# Patient Record
Sex: MALE | Race: WHITE | NOT HISPANIC OR LATINO | Employment: UNEMPLOYED | URBAN - METROPOLITAN AREA
[De-identification: names, ages, dates, MRNs, and addresses within clinical notes are randomized per-mention and may not be internally consistent; named-entity substitution may affect disease eponyms.]

---

## 2021-07-21 ENCOUNTER — HOSPITAL ENCOUNTER (EMERGENCY)
Facility: CLINIC | Age: 21
Discharge: HOME | End: 2021-07-21
Attending: PHYSICIAN ASSISTANT | Admitting: PHYSICIAN ASSISTANT
Payer: COMMERCIAL

## 2021-07-21 VITALS
OXYGEN SATURATION: 98 % | RESPIRATION RATE: 20 BRPM | DIASTOLIC BLOOD PRESSURE: 77 MMHG | TEMPERATURE: 98.7 F | HEART RATE: 89 BPM | SYSTOLIC BLOOD PRESSURE: 115 MMHG

## 2021-07-21 DIAGNOSIS — F19.10 DRUG ABUSE (H): ICD-10-CM

## 2021-07-21 PROCEDURE — 99284 EMERGENCY DEPT VISIT MOD MDM: CPT | Performed by: PHYSICIAN ASSISTANT

## 2021-07-21 PROCEDURE — 99283 EMERGENCY DEPT VISIT LOW MDM: CPT | Performed by: PHYSICIAN ASSISTANT

## 2021-07-21 ASSESSMENT — VISUAL ACUITY: OU: 1

## 2021-07-22 NOTE — DISCHARGE INSTRUCTIONS
It was a pleasure working with you today!  I am thankful that you are feeling much better after coming down from your mushroom high.  I would strongly recommend not using mushrooms again given how they affected you this evening.  Please do not hesitate to call 911 or return to the ED if your symptoms worsen.

## 2021-07-22 NOTE — ED TRIAGE NOTES
Pt was walking around [place] and found/picked up by EMS. Pt admitted to using 'shrooms', dilated pupils, paranoid and agreed to come for assessment. PD was able to contact mom and thought pt should be evaluated. Pt cooperative.

## 2021-07-22 NOTE — ED PROVIDER NOTES
"  History     Chief Complaint   Patient presents with     Drug Problem     HPI  Navin Dupree is a 20 year old male who presents for medical evaluation via EMS.  Patient was found wandering around the town of [place] in his stocking feet.  Patient admits to using a different type of mushroom that he has not used before.  Patient states that it was much stronger than previous experiences.  Reports increased hallucinations.  Reports doing the mushrooms at a friend's house in [place], and deciding to go for a walk.  Reports that EMS approached him.  Patient reports feeling safe, but agreed to a medical evaluation.  Patient denies any homicidal  or suicidal ideation.  Denies ever making any suicidal or homicidal statements to EMS.  EMS did not report any mention of suicidal ideation as well.  Patient reports that they use recreational mushrooms off and on.  They smoked some cigarettes this evening, but did not use any marijuana or other alcohol/drugs.  Reports they feel much better now.  Reports that the high has worn off significantly.  Denies any rhinorrhea, congestion, sore throat, change in taste/smell sensation, dyspnea, cough, chest pain, palpitations, abdominal pain, nausea, vomiting, fever, chills, diarrhea, or skin rash.  Denies regular alcohol use.  No withdrawal issues in the past.    Allergies:  No Known Allergies    Problem List:    There are no problems to display for this patient.       Past Medical History:    History reviewed. No pertinent past medical history.    Past Surgical History:    History reviewed. No pertinent surgical history.    Family History:    History reviewed. No pertinent family history.    Social History:  Marital Status:  Single [1]  Social History     Tobacco Use     Smoking status: Former Smoker     Smokeless tobacco: Never Used   Substance Use Topics     Alcohol use: Not Currently     Drug use: Yes     Comment: \"shrooms\"        Medications:    No current outpatient medications " on file.        Review of Systems   All other systems reviewed and are negative.      Physical Exam   BP: (!) 138/94  Pulse: 92  Temp: 98.7  F (37.1  C)  Resp: 18  SpO2: 99 %      Physical Exam  Vitals and nursing note reviewed.   Constitutional:       General: He is not in acute distress.     Appearance: He is not diaphoretic.   HENT:      Head: Normocephalic and atraumatic.      Comments: Negative gordon sign.  No tenderness to palpation throughout the head and scalp.  No sign of trauma.     Right Ear: Tympanic membrane, ear canal and external ear normal.      Left Ear: Tympanic membrane, ear canal and external ear normal.      Ears:      Comments: No hemotympanum.     Nose: Nose normal. No congestion or rhinorrhea.      Mouth/Throat:      Mouth: Mucous membranes are moist.      Pharynx: No oropharyngeal exudate.      Comments: No malocclusion.  No dental trauma.  Eyes:      General: Lids are normal. Vision grossly intact. No scleral icterus.        Right eye: No discharge.         Left eye: No discharge.      Extraocular Movements: Extraocular movements intact.      Conjunctiva/sclera: Conjunctivae normal.      Pupils: Pupils are equal, round, and reactive to light.      Comments: Pupils symmetrically bilateral dilated.  Respond to light and accommodation.   Neck:      Thyroid: No thyromegaly.   Cardiovascular:      Rate and Rhythm: Normal rate and regular rhythm.      Heart sounds: Normal heart sounds. No murmur heard.     Pulmonary:      Effort: Pulmonary effort is normal. No respiratory distress.      Breath sounds: Normal breath sounds. No wheezing or rales.   Chest:      Chest wall: No tenderness.   Abdominal:      General: Bowel sounds are normal. There is no distension.      Palpations: Abdomen is soft. There is no mass.      Tenderness: There is no abdominal tenderness. There is no right CVA tenderness, left CVA tenderness, guarding or rebound.   Musculoskeletal:         General: No tenderness or  deformity. Normal range of motion.      Cervical back: Normal range of motion and neck supple.      Comments: Bilateral upper and lower extremities without evidence for trauma.  No swelling, bruising, or change in range of motion of all of the joints.   Lymphadenopathy:      Cervical: No cervical adenopathy.   Skin:     General: Skin is warm and dry.      Capillary Refill: Capillary refill takes less than 2 seconds.      Findings: No erythema or rash.   Neurological:      Mental Status: He is alert and oriented to person, place, and time.      GCS: GCS eye subscore is 4. GCS verbal subscore is 5. GCS motor subscore is 6.      Cranial Nerves: Cranial nerves are intact. No cranial nerve deficit, dysarthria or facial asymmetry.      Sensory: Sensation is intact. No sensory deficit.      Motor: Motor function is intact. No weakness, tremor, atrophy or abnormal muscle tone.      Coordination: Coordination is intact. Romberg sign negative. Coordination normal. Finger-Nose-Finger Test and Heel to Shin Test normal. Rapid alternating movements normal.      Gait: Gait is intact.      Deep Tendon Reflexes:      Reflex Scores:       Bicep reflexes are 2+ on the right side and 2+ on the left side.       Patellar reflexes are 2+ on the right side and 2+ on the left side.  Psychiatric:         Attention and Perception: Attention and perception normal.         Mood and Affect: Mood normal.         Speech: Speech normal.         Behavior: Behavior normal. Behavior is cooperative.         Thought Content: Thought content normal. Thought content is not paranoid or delusional. Thought content does not include homicidal or suicidal ideation. Thought content does not include homicidal or suicidal plan.         Cognition and Memory: Cognition and memory normal.         Judgment: Judgment normal.         ED Course        Procedures              Critical Care time:  none               No results found for this or any previous visit (from  the past 24 hour(s)).    Medications - No data to display    Assessments & Plan (with Medical Decision Making)  Drug abuse (H)     20 year old individual who presents for evaluation of mushroom ingestion followed by confusion and hallucinations.  Brought in by EMS for medical evaluation.  No suicidal or homicidal thoughts.  No prior history of withdrawal.  No alcohol use.  Used a different type of mushroom that they have never experienced before.  Feels much better here in the ED and reports confusion and hallucinations have resolved.  See HPI for details.  On exam vital signs are stable.  Afebrile with a temperature of 98.7.  Exam without evidence for trauma.  Virtually normal other than bilateral dilated pupils which respond to light and accommodation.  Extraocular movements intact.  Neurologically intact.  No suicidal or homicidal ideation.  Patient appears safe.  Admits to feeling safe.  I contacted their mother, who will be the primary caregiver this evening.  She agreed to come to the ED to pick his Navin up.  No indication for further laboratory or imaging evaluation.  No sign of trauma.  No indication for psychiatric admission.  Patient states that they feel safe.  Indications for ED return reviewed.  Patient was in agreement with this plan and was suitable for discharge.     I have reviewed the nursing notes.    I have reviewed the findings, diagnosis, plan and need for follow up with the patient.       New Prescriptions    No medications on file       Final diagnoses:   Drug abuse (H) - mushrooms     Disclaimer: This note consists of symbols derived from keyboarding, dictation and/or voice recognition software. As a result, there may be errors in the script that have gone undetected. Please consider this when interpreting information found in this chart.    7/21/2021   [facility] EMERGENCY DEPT     Rodney Newman PA-C  07/21/21 1194

## 2022-08-30 ENCOUNTER — OFFICE VISIT (OUTPATIENT)
Dept: FAMILY MEDICINE | Facility: CLINIC | Age: 22
End: 2022-08-30
Payer: COMMERCIAL

## 2022-08-30 VITALS
HEART RATE: 60 BPM | WEIGHT: 170 LBS | TEMPERATURE: 98 F | BODY MASS INDEX: 21.82 KG/M2 | RESPIRATION RATE: 16 BRPM | HEIGHT: 74 IN | DIASTOLIC BLOOD PRESSURE: 78 MMHG | OXYGEN SATURATION: 97 % | SYSTOLIC BLOOD PRESSURE: 125 MMHG

## 2022-08-30 DIAGNOSIS — F41.9 ANXIETY: ICD-10-CM

## 2022-08-30 DIAGNOSIS — F64.9 GENDER DYSPHORIA: ICD-10-CM

## 2022-08-30 DIAGNOSIS — Z79.890 HORMONE REPLACEMENT THERAPY (HRT): ICD-10-CM

## 2022-08-30 DIAGNOSIS — Z00.00 ENCOUNTER FOR MEDICAL EXAMINATION TO ESTABLISH CARE: Primary | ICD-10-CM

## 2022-08-30 DIAGNOSIS — Z20.6 CONTACT WITH AND (SUSPECTED) EXPOSURE TO HUMAN IMMUNODEFICIENCY VIRUS (HIV): ICD-10-CM

## 2022-08-30 PROBLEM — F90.9 ADHD (ATTENTION DEFICIT HYPERACTIVITY DISORDER): Status: ACTIVE | Noted: 2022-08-30

## 2022-08-30 LAB
CHOLEST SERPL-MCNC: 171 MG/DL
ESTRADIOL SERPL-MCNC: 106 PG/ML
HDLC SERPL-MCNC: 70 MG/DL
HGB BLD-MCNC: 13.8 G/DL (ref 11.7–17.7)
LDLC SERPL CALC-MCNC: 73 MG/DL
NONHDLC SERPL-MCNC: 101 MG/DL
TRIGL SERPL-MCNC: 139 MG/DL

## 2022-08-30 PROCEDURE — 87491 CHLMYD TRACH DNA AMP PROBE: CPT

## 2022-08-30 PROCEDURE — 86780 TREPONEMA PALLIDUM: CPT

## 2022-08-30 PROCEDURE — 99204 OFFICE O/P NEW MOD 45 MIN: CPT | Mod: GC

## 2022-08-30 PROCEDURE — 82670 ASSAY OF TOTAL ESTRADIOL: CPT | Mod: KX

## 2022-08-30 PROCEDURE — 85018 HEMOGLOBIN: CPT

## 2022-08-30 PROCEDURE — 84403 ASSAY OF TOTAL TESTOSTERONE: CPT | Mod: KX

## 2022-08-30 PROCEDURE — 80053 COMPREHEN METABOLIC PANEL: CPT

## 2022-08-30 PROCEDURE — 80061 LIPID PANEL: CPT

## 2022-08-30 PROCEDURE — 86803 HEPATITIS C AB TEST: CPT

## 2022-08-30 PROCEDURE — 87591 N.GONORRHOEAE DNA AMP PROB: CPT

## 2022-08-30 PROCEDURE — 87389 HIV-1 AG W/HIV-1&-2 AB AG IA: CPT

## 2022-08-30 PROCEDURE — 36415 COLL VENOUS BLD VENIPUNCTURE: CPT

## 2022-08-30 RX ORDER — EMTRICITABINE AND TENOFOVIR DISOPROXIL FUMARATE 200; 300 MG/1; MG/1
1 TABLET, FILM COATED ORAL DAILY
Qty: 30 TABLET | Refills: 3 | Status: SHIPPED | OUTPATIENT
Start: 2022-08-30 | End: 2023-06-16

## 2022-08-30 RX ORDER — ESTRADIOL 2 MG/1
TABLET ORAL
COMMUNITY
Start: 2022-04-26 | End: 2023-06-16 | Stop reason: DRUGHIGH

## 2022-08-30 RX ORDER — SPIRONOLACTONE 100 MG/1
TABLET, FILM COATED ORAL
COMMUNITY
Start: 2022-06-22 | End: 2022-09-06

## 2022-08-30 RX ORDER — ESTRADIOL VALERATE 10 MG/ML
4 INJECTION INTRAMUSCULAR WEEKLY
Qty: 2 ML | Refills: 0 | Status: SHIPPED | OUTPATIENT
Start: 2022-08-30 | End: 2023-06-16

## 2022-08-30 RX ORDER — NEEDLES, DISPOSABLE 25GX5/8"
NEEDLE, DISPOSABLE MISCELLANEOUS
Qty: 100 EACH | Refills: 3 | Status: SHIPPED | OUTPATIENT
Start: 2022-08-30 | End: 2023-06-16

## 2022-08-30 RX ORDER — EMTRICITABINE AND TENOFOVIR DISOPROXIL FUMARATE 200; 300 MG/1; MG/1
1 TABLET, FILM COATED ORAL DAILY
COMMUNITY
Start: 2022-08-18 | End: 2022-08-30

## 2022-08-30 NOTE — PROGRESS NOTES
Assessment & Plan     Damaris is a 21 year old individual that uses pronouns She/Her/Hers/Herself that presents today for follow up of gender affirming care.      On feminizing HRT, goal TT <50   Hormone replacement therapy (HRT)  Gender dysphoria  Current medications: has been on spirinolactone 100mg/day and estradiol PO 6mg/day for 6 months  Plan for today  Labs today for 1 year on estrogen and harpreet. Repeat labs and follow up visit in 4 weeks   Switch from PO estradiol 6mg/day to IM valerate 4mg/week per pt preference.  Since on 6mg PO estradiol for last 6 months with good effects and no bothersome side effects, switching to moderate dose of estradiol valerate (4mg IM weekly). Continue 100mg spironolactone. Reviewed last labs (6mo ago), all normal, TT 48, estradiol safe level.   Feels confident with injections, will request injection teaching PRN  Risks/side effects: contraception: not needed (partner cannot get pregnant, no ovaries). VTE risk: low  If problems obtaining valerate, will refill PO estradiol.   - Testosterone Total; Future  - Comprehensive Metabolic Panel; Future  - Hemoglobin (HGB); Future  - Lipid Cascade; Future  - Estradiol; Future  Next steps  If labs abnormal, consider these next steps at next appointment  -  estradiol  - If >200 see in clinic for discussion about VTE risk and bone health  - on harpreet: annual CMP  - If electrolytes very abnormal, Plan: split dose  - If K low, Plan: consider supplement with K   Future labs  - Switching to estradiol valerate today, follow up in 1 month, then 6 months, then annually after that ( Hgb, Lipid, LFT s, TT, estradiol FDC between shots)    Reccomended health maintenance, feminizing HRT: On feminizing HRT, so the following are recommended for routine health maintenance:   DEXA scan: low risk, screen when patient >64 yo  CERVICAL CANCER SCREENING  No neocervix, screening not necessary    Options for treatment and follow-up care were reviewed with the  "patient.Damaris engaged in the decision making process and verbalized understanding of the options discussed and agreed with the final plan. Counselled patient about controlled substances, never share. Contraception discussed. Educated about testosterone as absolute contraindication in pregnancy. Discussed routine health maintenance for people on HRT. Understanding of risks and benefits of HRT. Doing well overall.     HRT Meds  - estradiol valerate (DELESTROGEN) 10 MG/ML OIL injection; Inject 0.4 mLs (4 mg) into the muscle once a week for 35 days  Dispense: 2 mL; Refill: 0  - Needle, Disp, (B-D BLUNT FILL NEEDLE) 18G X 1-1/2\" MISC; Use to draw up medication. Use new needle each time.  Dispense: 100 each; Refill: 3  - Needle, Disp, (B-D HYPODERMIC NEEDLE) 25G X 1\" MISC; For injection into thigh. Use new needle every time.  Dispense: 100 each; Refill: 3  - HIV Antigen Antibody Combo; Future  - Chlamydia trachomatis/Neisseria gonorrhoeae by PCR  - Hepatitis C Screen Reflex to HCV RNA Quant and Genotype; Future  - Treponema Abs w Reflex to RPR and Titer; Future  - Adult Mental Health  Referral; Future    Anxiety  - Adult Mental Health Referral; Future    On pre-exposure prophylaxis for HIV  - HIV Antigen Antibody Combo; Future  - Chlamydia trachomatis/Neisseria gonorrhoeae by PCR  - Hepatitis C Screen Reflex to HCV RNA Quant and Genotype; Future  - Treponema Abs w Reflex to RPR and Titer; Future  - emtricitabine-tenofovir (TRUVADA) 200-300 MG per tablet; Take 1 tablet by mouth daily  Dispense: 30 tablet; Refill: 3    Follow up plan  Return in about 4 weeks (around 9/27/2022) for Follow up, with me.    Ordering of each unique test  Prescription drug management  60 minutes spent on the date of the encounter doing chart review, review of outside records, patient visit and documentation            Return in about 4 weeks (around 9/27/2022) for Follow up, with me.    Samira Means MD  [facility] " "[facility]ISS      Subjective     Establish Care (Pt here to establish care and to discuss continuation of HRT. Currently taking estradiol. ) and Refill Request ([place] )      KATI Ocampo is a 21 year old individual that uses pronouns She/Her/Hers/Herself that presents today for follow up of feminizing hormone replacement therapy for gender affirmation.     HRT History  Started: 100mg rory, 2mg estradiol BID (4mg/day total)  Increased 6 mo ago to 6mg todal (2mg AM, 4mg PM)      CurrentLY  - Currently on estradiol PO 2mg (estradiol),   - Currently on 100mg Rory  - goal TT <50     Happy with changes? Yes    Changes they would like  More breast development  Softer hair    Future plans  FFS at some point    What changes are you noticing?   Breast buds stopped hurting about 1.5 months ago    Fat redistribution:yes    Weight change: no    Voice change: yes    Change in hair: No    Chest changes/development: YES    Side effects?    Chest Pains: No    Shortness of breath: No    Decreased exercise tolerance:  No    Leg pain or swelling: No    Abdominal pain: No    Change in appetite: No    Acne or oily skin: YES acne cleared up    Change in libido: different, not bad    Mood: good    Sexual side effects: no    Sexual history    New sexual partners: No    Contraception: not needed (partner does not have ovaries)      [unfilled]  No flowsheet data found.      Review of Systems   Constitutional, HEENT, cardiovascular, pulmonary, gi and gu systems are negative, except as otherwise noted.      Objective    /78   Pulse 60   Temp 98  F (36.7  C) (Oral)   Resp 16   Ht 1.88 m (6' 2.02\")   Wt 77.1 kg (170 lb)   SpO2 97%   BMI 21.82 kg/m    Body mass index is 21.82 kg/m .  Physical Exam   General: Alert and oriented, in no acute distress.  Skin: Warm and dry, no abnormalities noted.  Eyes: Extra-ocular muscles grossly intact, pupils equal.  ENT: Speech intact, nasal passages open, no hearing impairment noted.  CV: No " cyanosis or pallor, warm and well perfused.  Respiratory: No respiratory distress, no accessory muscle use.  Neuro: Gait and station normal, comprehension intact. Gross and fine motor skills intact.   Psychiatric: Mood and affect appear normal.   Extremities: Warm, able to move all four extremities at will.      Results for orders placed or performed in visit on 08/30/22 (from the past 24 hour(s))   Hemoglobin (HGB)   Result Value Ref Range    Hemoglobin 13.8 11.7 - 17.7 g/dL    Narrative    The sex of this patient cannot be reliably determined based on discrepancies in demographics (legal sex, sex assigned at birth, gender identity).  Both male and female reference ranges are provided where applicable.  Careful evaluation of the patient s results as compared to the gender specific reference intervals is required in this setting.        ----- Service Performed and Documented by Resident or Fellow ------        Samira Means MD on 8/30/2022 at 3:41 PM        .  ..

## 2022-08-30 NOTE — PROGRESS NOTES
Preceptor Attestation:   Patient seen, evaluated and discussed with the resident. I have verified the content of the note, which accurately reflects my assessment of the patient and the plan of care.   Supervising Physician:  Navi Pelletier MD

## 2022-08-30 NOTE — PATIENT INSTRUCTIONS
"So nice to meet you today!  Please schedule the following appointments in this order:  Appointment with me in 4-5 weeks, preferably with me.   \"Lab-only\" appointment a few days before our HRT follow up appointment in 4-5 weeks. Please schedule that appointment for a Wednesday (3 days after doing shot)  Call the [facility] clinic [phone] or stop by the  to schedule     If you don't get a call to schedule therapy, call [facility]  at [phone]  Start estradiol valerate IM, 4mg once/week this Sunday  Keep taking 100mg harpreet  Labs    Patient Education   Here is the plan from today's visit    Hormone replacement therapy (HRT)  HRT Labs  Labs today for 1 year on estrogen and harpreet. Repeat labs in 4 weeks since changing to Estradiol valerate 4mg once/week, IM.   - Testosterone Total; Future  - Comprehensive Metabolic Panel; Future  - Hemoglobin (HGB); Future  - Lipid Cascade; Future  - Estradiol; Future    HRT Meds  - estradiol valerate (DELESTROGEN) 10 MG/ML OIL injection; Inject 0.4 mLs (4 mg) into the muscle once a week for 35 days  Dispense: 2 mL; Refill: 0  - Needle, Disp, (B-D BLUNT FILL NEEDLE) 18G X 1-1/2\" MISC; Use to draw up medication. Use new needle each time.  Dispense: 100 each; Refill: 3  - Needle, Disp, (B-D HYPODERMIC NEEDLE) 25G X 1\" MISC; For injection into thigh. Use new needle every time.  Dispense: 100 each; Refill: 3  - HIV Antigen Antibody Combo; Future  - Chlamydia trachomatis/Neisseria gonorrhoeae by PCR  - Hepatitis C Screen Reflex to HCV RNA Quant and Genotype; Future  - Treponema Abs w Reflex to RPR and Titer; Future  - Adult Mental Health  Referral; Future    Anxiety  - Adult Mental Health Referral; Future    On pre-exposure prophylaxis for HIV  - HIV Antigen Antibody Combo; Future  - Chlamydia trachomatis/Neisseria gonorrhoeae by PCR  - Hepatitis C Screen Reflex to HCV RNA Quant and Genotype; Future  - Treponema Abs w Reflex to RPR and Titer; Future  - " emtricitabine-tenofovir (TRUVADA) 200-300 MG per tablet; Take 1 tablet by mouth daily  Dispense: 30 tablet; Refill: 3        Follow up plan  Return in about 4 weeks (around 9/27/2022) for Follow up, with me.    Thank you for coming to [facility] today.  Lab Testing:  **If you had lab testing today and your results are reassuring or normal they will be mailed to you or sent through [employer] within 7 days.   **If the lab tests need quick action we will call you with the results.  **If you are having labs done on a different day, please call [phone] to schedule at [facility] or [phone] for other [facility] Lab locations. Labs do not offer walk-in appointments.  The phone number we will call with results is # [phone] (home) . If this is not the best number please call our clinic and change the number.  Medication Refills:  If you need any refills please call your pharmacy and they will contact us.   If you need to  your refill at a new pharmacy, please contact the new pharmacy directly. The new pharmacy will help you get your medications transferred faster.   Scheduling:  If you have any concerns about today's visit or wish to schedule another appointment please call our office during normal business hours [phone] (8-5:00 M-F)  If a referral was made to an [facility] specialty provider and you do not get a call from central scheduling, please refer to directions on your visit summary or call our office during normal business hours for assistance.   If a Mammogram was ordered for you at the Breast Center call [phone] to schedule or change your appointment.  If you had an XRay/CT/Ultrasound/MRI ordered the number is [phone] to schedule or change your radiology appointment.   [facility] has limited ultrasound appointments available on Wednesdays, if you would like your ultrasound at [facility], please call [phone] to schedule.   Medical  Concerns:  If you have urgent medical concerns please call [phone] at any time of the day.    Samira Means MD

## 2022-08-31 LAB
ALBUMIN SERPL BCG-MCNC: 4.7 G/DL (ref 3.5–5.2)
ALP SERPL-CCNC: 55 U/L (ref 35–129)
ALT SERPL W P-5'-P-CCNC: 35 U/L (ref 10–50)
ANION GAP SERPL CALCULATED.3IONS-SCNC: 10 MMOL/L (ref 7–15)
AST SERPL W P-5'-P-CCNC: 31 U/L (ref 10–50)
BILIRUB SERPL-MCNC: 0.3 MG/DL
BUN SERPL-MCNC: 14.9 MG/DL (ref 6–20)
C TRACH DNA SPEC QL PROBE+SIG AMP: NEGATIVE
CALCIUM SERPL-MCNC: 9.4 MG/DL (ref 8.6–10)
CHLORIDE SERPL-SCNC: 101 MMOL/L (ref 98–107)
CREAT SERPL-MCNC: 0.88 MG/DL (ref 0.51–1.17)
DEPRECATED HCO3 PLAS-SCNC: 24 MMOL/L (ref 22–29)
GFR SERPL CREATININE-BSD FRML MDRD: >90 ML/MIN/1.73M2
GLUCOSE SERPL-MCNC: 91 MG/DL (ref 70–99)
HCV AB SERPL QL IA: NONREACTIVE
HIV 1+2 AB+HIV1 P24 AG SERPL QL IA: NONREACTIVE
N GONORRHOEA DNA SPEC QL NAA+PROBE: NEGATIVE
POTASSIUM SERPL-SCNC: 4.6 MMOL/L (ref 3.4–5.3)
PROT SERPL-MCNC: 7.4 G/DL (ref 6.4–8.3)
SODIUM SERPL-SCNC: 135 MMOL/L (ref 136–145)
T PALLIDUM AB SER QL: NONREACTIVE

## 2022-08-31 NOTE — RESULT ENCOUNTER NOTE
Dear Damaris,     Here are your recent results, all which are normal. Estradiol is in a safe range. Your sodium (Na) was low, but only one point, and everything else was normal so I don't think we need to do anything differently. Will recheck it with next set of HRT follow up labs.     Please continue with your current plan of care and let us know if you have any questions or concerns.    Testosterone Total takes a few days to come back, so still waiting on that.     Regards,  Samira Means MD

## 2022-09-02 LAB — TESTOST SERPL-MCNC: 201 NG/DL (ref 8–950)

## 2022-09-03 NOTE — RESULT ENCOUNTER NOTE
Dear Damaris  Your total testosterone was a little bit above the typical goal on feminizing HRT, which is less than 50.  Your last testosterone level was within goal.  We can talk about making changes to your medications at our next visit, if that something that you would like to do. the spironolactone should be blocking a lot of the androgen effects of the testosterone, even if your testosterone is not being suppressed as much as it could be, so that is good.  Looking forward to seeing you at your next appointment,  Samira Means MD on 9/3/2022 at 12:20 PM

## 2022-09-08 ENCOUNTER — IMMUNIZATION (OUTPATIENT)
Dept: FAMILY MEDICINE | Facility: CLINIC | Age: 22
End: 2022-09-08
Payer: COMMERCIAL

## 2022-09-08 DIAGNOSIS — Z23 HIGH PRIORITY FOR 2019-NCOV VACCINE: Primary | ICD-10-CM

## 2022-09-08 PROCEDURE — 99207 PR NO CHARGE LOS: CPT

## 2022-09-08 PROCEDURE — 0051A COVID-19,PF,PFIZER (12+ YRS): CPT

## 2022-09-08 PROCEDURE — 91305 COVID-19,PF,PFIZER (12+ YRS): CPT

## 2022-10-16 ENCOUNTER — TELEPHONE (OUTPATIENT)
Dept: FAMILY MEDICINE | Facility: CLINIC | Age: 22
End: 2022-10-16

## 2022-10-16 NOTE — TELEPHONE ENCOUNTER
Prior Authorization Retail Medication Request    Medication/Dose: estradiol valerate (DELESTROGEN) 10 MG/ML OIL injection  ICD code (if different than what is on RX):    Hormone replacement therapy (HRT) [Z79.890]       Gender dysphoria [F64.9]           Previously Tried and Failed:  See Chart  Rationale:  See Chart     Insurance Name:  [employer] [place]  Insurance ID:  [id number]       Pharmacy Information (if different than what is on RX)  Name:    [id number] - [place] - [address]       Phone:  [phone]

## 2022-10-18 ENCOUNTER — IMMUNIZATION (OUTPATIENT)
Dept: NURSING | Facility: CLINIC | Age: 22
End: 2022-10-18
Payer: COMMERCIAL

## 2022-10-18 PROCEDURE — 0052A COVID-19,PF,PFIZER (12+ YRS): CPT

## 2022-10-18 PROCEDURE — 91305 COVID-19,PF,PFIZER (12+ YRS): CPT

## 2022-10-18 NOTE — TELEPHONE ENCOUNTER
Central Prior Authorization Team   Phone: [phone]      PA NOT NEEDED    Medication: estradiol valerate (DELESTROGEN) 10 MG/ML OIL injection  Insurance Company: [employer] - Phone [phone] Fax [phone]  Pharmacy Filling the Rx: [id number] - [place], [place] - [address]  Filling Pharmacy Phone: [phone]  Filling Pharmacy Fax:    Start Date: 10/18/2022    Insurance prefers the generic but that comes in a strength of 20mg/mL oil, the RPH at the pharmacy adjusted the directions for the 20mg and will order the medication in for the patient.  **Instructed pharmacy to notify patient when script is ready to /ship.**

## 2022-10-23 ENCOUNTER — HEALTH MAINTENANCE LETTER (OUTPATIENT)
Age: 22
End: 2022-10-23

## 2022-12-10 ENCOUNTER — HEALTH MAINTENANCE LETTER (OUTPATIENT)
Age: 22
End: 2022-12-10

## 2023-02-08 ENCOUNTER — DOCUMENTATION ONLY (OUTPATIENT)
Dept: LAB | Facility: CLINIC | Age: 23
End: 2023-02-08
Payer: COMMERCIAL

## 2023-02-09 ENCOUNTER — TELEPHONE (OUTPATIENT)
Dept: FAMILY MEDICINE | Facility: CLINIC | Age: 23
End: 2023-02-09
Payer: COMMERCIAL

## 2023-02-09 DIAGNOSIS — F64.9 GENDER DYSPHORIA: ICD-10-CM

## 2023-02-09 DIAGNOSIS — Z79.890 HORMONE REPLACEMENT THERAPY (HRT): Primary | ICD-10-CM

## 2023-02-09 NOTE — TELEPHONE ENCOUNTER
Tried to call patient to set up a virtual visit with Dr. Means to follow up on the lab work being done on 02/20. Voicemail not set up- not able to leave a message. (Appointment request message)

## 2023-02-10 NOTE — CONFIDENTIAL NOTE
Orders only encounter for feminizing HRT follow up labs    Testosterone total  Estradiol  BMP (on harpreet)    Previous lipid and Hgb normal, no need for repeat    Smaira Means MD

## 2023-05-17 ENCOUNTER — LAB (OUTPATIENT)
Dept: LAB | Facility: CLINIC | Age: 23
End: 2023-05-17
Payer: COMMERCIAL

## 2023-05-17 DIAGNOSIS — F64.9 GENDER DYSPHORIA: ICD-10-CM

## 2023-05-17 DIAGNOSIS — Z79.890 HORMONE REPLACEMENT THERAPY (HRT): ICD-10-CM

## 2023-05-17 PROCEDURE — 84403 ASSAY OF TOTAL TESTOSTERONE: CPT

## 2023-05-17 PROCEDURE — 80048 BASIC METABOLIC PNL TOTAL CA: CPT

## 2023-05-17 PROCEDURE — 82670 ASSAY OF TOTAL ESTRADIOL: CPT

## 2023-05-17 PROCEDURE — 36415 COLL VENOUS BLD VENIPUNCTURE: CPT

## 2023-05-18 LAB
ANION GAP SERPL CALCULATED.3IONS-SCNC: 11 MMOL/L (ref 7–15)
BUN SERPL-MCNC: 10.6 MG/DL (ref 6–20)
CALCIUM SERPL-MCNC: 9.6 MG/DL (ref 8.6–10)
CHLORIDE SERPL-SCNC: 104 MMOL/L (ref 98–107)
CREAT SERPL-MCNC: 0.99 MG/DL (ref 0.51–1.17)
DEPRECATED HCO3 PLAS-SCNC: 24 MMOL/L (ref 22–29)
ESTRADIOL SERPL-MCNC: 192 PG/ML
GFR SERPL CREATININE-BSD FRML MDRD: >90 ML/MIN/1.73M2
GLUCOSE SERPL-MCNC: 87 MG/DL (ref 70–99)
POTASSIUM SERPL-SCNC: 4.2 MMOL/L (ref 3.4–5.3)
SODIUM SERPL-SCNC: 139 MMOL/L (ref 136–145)

## 2023-05-19 LAB — TESTOST SERPL-MCNC: 348 NG/DL (ref 8–950)

## 2023-05-19 NOTE — RESULT ENCOUNTER NOTE
Results communicated to patient via [email or web address]      Dear Damaris  Here are your results. Your testosterone is 348. I would like to discuss this in clinic to see if we need to make medication changes. Please make an appointment with me at your soonest convenience. Video visit is ok. Please call the  to make this appointment during business hours.

## 2023-06-16 ENCOUNTER — OFFICE VISIT (OUTPATIENT)
Dept: FAMILY MEDICINE | Facility: CLINIC | Age: 23
End: 2023-06-16
Payer: COMMERCIAL

## 2023-06-16 VITALS
HEIGHT: 74 IN | OXYGEN SATURATION: 100 % | RESPIRATION RATE: 17 BRPM | WEIGHT: 184.4 LBS | TEMPERATURE: 98.8 F | BODY MASS INDEX: 23.66 KG/M2

## 2023-06-16 DIAGNOSIS — Z79.890 HORMONE REPLACEMENT THERAPY (HRT): Primary | ICD-10-CM

## 2023-06-16 DIAGNOSIS — F64.9 GENDER DYSPHORIA: ICD-10-CM

## 2023-06-16 DIAGNOSIS — F17.290 NICOTINE DEPENDENCE DUE TO VAPING TOBACCO PRODUCT: ICD-10-CM

## 2023-06-16 DIAGNOSIS — Z71.6 ENCOUNTER FOR SMOKING CESSATION COUNSELING: ICD-10-CM

## 2023-06-16 DIAGNOSIS — Z20.6 CONTACT WITH AND (SUSPECTED) EXPOSURE TO HUMAN IMMUNODEFICIENCY VIRUS (HIV): ICD-10-CM

## 2023-06-16 LAB
CREAT SERPL-MCNC: 0.94 MG/DL (ref 0.51–1.17)
GFR SERPL CREATININE-BSD FRML MDRD: >90 ML/MIN/1.73M2

## 2023-06-16 PROCEDURE — 99214 OFFICE O/P EST MOD 30 MIN: CPT | Mod: GC

## 2023-06-16 PROCEDURE — 36415 COLL VENOUS BLD VENIPUNCTURE: CPT

## 2023-06-16 PROCEDURE — 82565 ASSAY OF CREATININE: CPT

## 2023-06-16 PROCEDURE — 87389 HIV-1 AG W/HIV-1&-2 AB AG IA: CPT

## 2023-06-16 RX ORDER — SYRINGE AND NEEDLE,INSULIN,1ML 25GX1"
SYRINGE, EMPTY DISPOSABLE MISCELLANEOUS
COMMUNITY
Start: 2022-09-02 | End: 2023-06-16

## 2023-06-16 RX ORDER — ESTRADIOL 2 MG/1
TABLET ORAL
Qty: 284 TABLET | Refills: 0 | Status: SHIPPED | OUTPATIENT
Start: 2023-06-16 | End: 2023-06-27

## 2023-06-16 RX ORDER — ESTRADIOL VALERATE 20 MG/ML
INJECTION INTRAMUSCULAR
COMMUNITY
Start: 2022-11-06 | End: 2023-06-16

## 2023-06-16 RX ORDER — EMTRICITABINE AND TENOFOVIR DISOPROXIL FUMARATE 200; 300 MG/1; MG/1
1 TABLET, FILM COATED ORAL DAILY
Qty: 30 TABLET | Refills: 3 | Status: SHIPPED | OUTPATIENT
Start: 2023-06-16 | End: 2023-10-18

## 2023-06-16 RX ORDER — ESTRADIOL 2 MG/1
TABLET ORAL
Status: CANCELLED | OUTPATIENT
Start: 2023-06-16

## 2023-06-16 RX ORDER — SPIRONOLACTONE 100 MG/1
200 TABLET, FILM COATED ORAL DAILY
Qty: 180 TABLET | Refills: 3 | Status: CANCELLED | OUTPATIENT
Start: 2023-06-16

## 2023-06-16 RX ORDER — SPIRONOLACTONE 100 MG/1
200 TABLET, FILM COATED ORAL DAILY
Qty: 180 TABLET | Refills: 3 | Status: SHIPPED | OUTPATIENT
Start: 2023-06-16 | End: 2023-10-20

## 2023-06-16 RX ORDER — EMTRICITABINE AND TENOFOVIR DISOPROXIL FUMARATE 200; 300 MG/1; MG/1
1 TABLET, FILM COATED ORAL DAILY
Qty: 30 TABLET | Refills: 3 | Status: CANCELLED | OUTPATIENT
Start: 2023-06-16

## 2023-06-16 ASSESSMENT — PAIN SCALES - GENERAL: PAINLEVEL: NO PAIN (0)

## 2023-06-16 NOTE — PATIENT INSTRUCTIONS
Thank you for coming to [facility] today.  Here is the plan from today's visit  Resume oral estrogen, 4 mg once/day  Increase to 6mg once/day after 1 week  I will send 90 day supply  Call  to schedule visit with me  Follow up in 3 months for repeat testosterone and PrEP labs and check-in with me  PrEP labs today  I will send prescription for PrEP today    Results will be communicated via: [email or web address]  Note: Please allow 7 business days for communication of lab results.   Urgent lab results will be communicated sooner with telephone call.     Lab Testing:  **If you had lab testing today and your results are reassuring or normal they will be mailed to you or sent through [employer] within 7 days.   **If the lab tests need quick action we will call you with the results.  **If you are having labs done on a different day, please call [phone] to schedule at [facility] or [phone] for other [facility] locations. Labs do not offer walk-in appointments.  The phone number we will call with results is # [phone] (home) . If this is not the best number please call our clinic and change the number.  Medication Refills:  If you need any refills please call your pharmacy and they will contact us.   If you need to  your refill at a new pharmacy, please contact the new pharmacy directly. The new pharmacy will help you get your medications transferred faster.   Scheduling:  If a referral was made to an [facility] specialty provider and you do not get a call from central scheduling, please refer to directions on your visit summary or call our office during normal business hours for assistance: [phone] (8-5:00 M-F)  If you have any concerns about today's visit or wish to schedule another appointment please call our office during normal business hours [phone] (8-5:00 M-F)  If a Mammogram was ordered for you at the [facility] call [phone] to schedule or change  your appointment.  If you had an XRay/CT/Ultrasound/MRI ordered the number is [phone] to schedule or change your radiology appointment.   [facility] has limited ultrasound appointments available on Wednesdays, if you would like your ultrasound at [facility], please call [phone] to schedule.   Medical Concerns:  If you have urgent medical concerns please call [phone] at any time of the day.    Samira Means MD

## 2023-06-16 NOTE — PROGRESS NOTES
Assessment & Plan     Damaris is a 22 year old patient who presents for the following issues:    Hormone replacement therapy (HRT)  Gender dysphoria  Happy with feminizing changes so far. Started HRT 2/2022. 8/30/22 switched from 6mg PO estradiol daily to 4mg estradiol valerate IM once/week. Then had difficulty taking consistently so was going average of 13 days between shots. Likely why TT was above goal when checked. Seeing return of body hair and spontaneous erections which is bothersome.   Last labs reviewed: 1 month ago (5/17/23) BMP WNL, testosterone 348. Estradiol 192 which is within normal limits.   Testosterone levels have not been within goal (<50) considering desires for physical changes.   Risks/side effects: contraception: not needed (partner cannot get pregnant, no ovaries). VTE risk: low-moderate. Smoking cessation counseling provided.   Plan:  - Resume oral estradiol 4mg, increase to 6mg after 7 days (previous stable dose)  - Continue spirinolactone  - Labs: due for labs today? NO   - Spironolactone: annual CMP normal, next due 6/16/2024   - Estradiol: annual Hgb, Lipid, LFTs, estradiol normal, next due 6/16/2024   - TT above goal, resume oral estradiol and repeat in 3 months (ordered)  - spironolactone (ALDACTONE) 100 MG tablet  Dispense: 180 tablet; Refill: 3  - estradiol (ESTRACE) 2 MG tablet  Dispense: 284 tablet; Refill: 0      Contact with and (suspected) exposure to human immunodeficiency virus (hiv)  On PrEP, due for labs. Q6mo Cr and Q3mo HIV test.   - Creatinine  - HIV Antigen Antibody Combo  - emtricitabine-tenofovir (TRUVADA) 200-300 MG per tablet  Dispense: 30 tablet; Refill: 3  - Repeat HIV in 3 months (ordered)    Nicotine dependence  Smoking cessation  Counseled, would like to try lozenges.  -Nicotine lozenge 4mg q1h PRN      Orders Placed This Encounter   Procedures     Creatinine     HIV Antigen Antibody Combo     HIV Antigen Antibody Combo     Testosterone total         Post  "Medication Reconciliation Status: medications reconciled and changed, per note/orders    Return in about 3 months (around 9/16/2023) for with me.    Samira Means MD  [facility]    Subjective   HPI     Follow Up (HRT f/u)    Damaris is a 22 year old individual that uses pronouns She/Her/Hers/Herself that presents today for follow up of feminizing hormone replacement therapy for gender affirmation.     Started HRT 2/2022. 8/30/22 switched from 6mg PO estradiol daily to 4mg estradiol valerate IM once/week. Then had difficulty taking consistently so was going average of 13 days between shots. Experienced mood lability from this. Also having return of body hair and spontaneous erections which is bothersome. Happy with feminizing effects they are seeing. Seeing reversal of redistribution of body fat which is bothersome. Other than the return of the above physical changes, happy with HRT. Would like to return to oral estradiol.     Smoking vape, switched from cigarettes. Interested in NRT.        Review of Systems   Constitutional, HEENT, cardiovascular, pulmonary, gi and gu systems are negative, except as otherwise noted.      Objective    Temp 98.8  F (37.1  C)   Resp 17   Ht 1.88 m (6' 2\")   Wt 83.6 kg (184 lb 6.4 oz)   SpO2 100%   BMI 23.68 kg/m    Wt Readings from Last 4 Encounters:   06/16/23 83.6 kg (184 lb 6.4 oz)   08/30/22 77.1 kg (170 lb)       Physical Exam    General: Alert and oriented, in no acute distress.  CV: No cyanosis or pallor, warm and well perfused.  Respiratory: No respiratory distress, no accessory muscle use.  Psychiatric: Mood and affect appear normal.       Results are ordered and pending    Samira Means MD on 6/16/2023 at 11:28 AM    ----- Service Performed and Documented by Resident or Fellow ------            .  "

## 2023-06-17 ENCOUNTER — MYC REFILL (OUTPATIENT)
Dept: FAMILY MEDICINE | Facility: CLINIC | Age: 23
End: 2023-06-17
Payer: COMMERCIAL

## 2023-06-17 DIAGNOSIS — Z79.890 HORMONE REPLACEMENT THERAPY (HRT): ICD-10-CM

## 2023-06-17 DIAGNOSIS — F64.9 GENDER DYSPHORIA: ICD-10-CM

## 2023-06-17 LAB — HIV 1+2 AB+HIV1 P24 AG SERPL QL IA: NONREACTIVE

## 2023-06-20 DIAGNOSIS — F64.9 GENDER DYSPHORIA: ICD-10-CM

## 2023-06-20 DIAGNOSIS — Z79.890 HORMONE REPLACEMENT THERAPY (HRT): ICD-10-CM

## 2023-06-20 NOTE — TELEPHONE ENCOUNTER
"Request for medication refill: estradiol (ESTRACE) 2 MG tablet    Last Visit- 06/16/2023    Providers if patient needs an appointment and you are willing to give a one month supply please refill for one month and  send a letter/MyChart using \".SMILLIMITEDREFILL\" .smillimited and route chart to \"P SMI \" (Giving one month refill in non controlled medications is strongly recommended before denial)    If refill has been denied, meaning absolutely no refills without visit, please complete the smart phrase \".smirxrefuse\" and route it to the \"P SMI MED REFILLS\"  pool to inform the patient and the pharmacy.    Navi Miranda [facility]        "

## 2023-06-20 NOTE — PROGRESS NOTES
Addendum    No new sexual partners since last STI screening, so screening aside from HIV for PrEP not indicated.    Samira Means MD

## 2023-06-27 ENCOUNTER — MYC REFILL (OUTPATIENT)
Dept: FAMILY MEDICINE | Facility: CLINIC | Age: 23
End: 2023-06-27
Payer: COMMERCIAL

## 2023-06-27 DIAGNOSIS — F64.9 GENDER DYSPHORIA: ICD-10-CM

## 2023-06-27 DIAGNOSIS — Z79.890 HORMONE REPLACEMENT THERAPY (HRT): ICD-10-CM

## 2023-06-27 RX ORDER — ESTRADIOL 2 MG/1
TABLET ORAL
Qty: 284 TABLET | Refills: 0 | Status: SHIPPED | OUTPATIENT
Start: 2023-06-27 | End: 2023-10-20

## 2023-06-28 RX ORDER — ESTRADIOL 2 MG/1
TABLET ORAL
Qty: 284 TABLET | Refills: 0 | OUTPATIENT
Start: 2023-06-28 | End: 2023-10-03

## 2023-10-16 ENCOUNTER — MYC REFILL (OUTPATIENT)
Dept: FAMILY MEDICINE | Facility: CLINIC | Age: 23
End: 2023-10-16
Payer: COMMERCIAL

## 2023-10-16 DIAGNOSIS — Z79.890 HORMONE REPLACEMENT THERAPY (HRT): ICD-10-CM

## 2023-10-16 DIAGNOSIS — F64.9 GENDER DYSPHORIA: ICD-10-CM

## 2023-10-18 DIAGNOSIS — Z79.890 HORMONE REPLACEMENT THERAPY (HRT): ICD-10-CM

## 2023-10-18 DIAGNOSIS — F64.9 GENDER DYSPHORIA: ICD-10-CM

## 2023-10-18 DIAGNOSIS — Z20.6 CONTACT WITH AND (SUSPECTED) EXPOSURE TO HUMAN IMMUNODEFICIENCY VIRUS (HIV): Primary | ICD-10-CM

## 2023-10-18 DIAGNOSIS — Z20.6 CONTACT WITH AND (SUSPECTED) EXPOSURE TO HUMAN IMMUNODEFICIENCY VIRUS (HIV): ICD-10-CM

## 2023-10-18 RX ORDER — EMTRICITABINE AND TENOFOVIR DISOPROXIL FUMARATE 200; 300 MG/1; MG/1
1 TABLET, FILM COATED ORAL DAILY
Qty: 30 TABLET | Refills: 1 | Status: SHIPPED | OUTPATIENT
Start: 2023-10-18

## 2023-10-18 NOTE — TELEPHONE ENCOUNTER
"Request for medication refill:  emtricitabine-tenofovir (TRUVADA) 200-300 MG per table     Providers if patient needs an appointment and you are willing to give a one month supply please refill for one month and  send a letter/MyChart using \".SMILLIMITEDREFILL\" .smillimited and route chart to \"P SMI \" (Giving one month refill in non controlled medications is strongly recommended before denial)    If refill has been denied, meaning absolutely no refills without visit, please complete the smart phrase \".smirxrefuse\" and route it to the \"P SMI MED REFILLS\"  pool to inform the patient and the pharmacy.    Kandace Paz CMA      "

## 2023-10-18 NOTE — TELEPHONE ENCOUNTER
HIV Prevention / PrEP - Refill    Last HIV test: pos/neg: negative Date: 6/16/2023 (4 months ago)  Last Cr: normal, Date Date: 6/16/2023 (4 months ago)    Plan  PrEP follow up lab schedule  - HIV antigen / antiobdy combo q6mo, next due: 12/16/2023  - Serum Cr q6mo, next due: 12/16/2023  Refill for 2 months    Samira Means MD  10/18/2023

## 2023-10-20 RX ORDER — ESTRADIOL 2 MG/1
6 TABLET ORAL DAILY
Qty: 90 TABLET | Refills: 2 | Status: SHIPPED | OUTPATIENT
Start: 2023-10-20 | End: 2024-03-20

## 2023-10-20 RX ORDER — SPIRONOLACTONE 100 MG/1
200 TABLET, FILM COATED ORAL DAILY
Qty: 180 TABLET | Refills: 0 | Status: SHIPPED | OUTPATIENT
Start: 2023-10-20 | End: 2024-06-13

## 2023-10-21 NOTE — TELEPHONE ENCOUNTER
Refill Request    Medication:   - estradiol (ESTRACE) 2 MG tablet, 6mg by mouth daily  - spirinolactone 200mg daily    Assessment:  I have reviewed the most recent progress note and labs related to this medication, and the refill request is in line with the current plan of care.    Most recent labs:   Supra therapeutic testosterone level 5/17/23 at 348, likely due to missing injections.     Most recent progress note:   6/16/23: injecting estradiol didn't work out due to schedule, returned to 6mg estradiol PO continue harpreet, follow up in 3 months for TT recheck (9/16/23). next due for CMP, hgb, lipid and estradiol 6/2024    Plan:  Bridge refill for 60 days estradiol, 90 days spironolactone, to give patient time to schedule appointment    Hormone replacement therapy (HRT)  -     estradiol (ESTRACE) 2 MG tablet; Take 3 tablets (6 mg) by mouth daily  -     spironolactone (ALDACTONE) 100 MG tablet; TAKE TWO TABLETS BY MOUTH DAILY      Samira Means MD

## 2023-10-24 RX ORDER — ESTRADIOL 2 MG/1
TABLET ORAL
Qty: 284 TABLET | Refills: 0 | OUTPATIENT
Start: 2023-10-24 | End: 2024-01-28

## 2023-10-24 NOTE — TELEPHONE ENCOUNTER
Duplicate.  Estradiol sent & recv'd per pharmacy on 10/20/23.    Trini HERNANDEZ, [place], [facility]

## 2023-11-05 ENCOUNTER — HEALTH MAINTENANCE LETTER (OUTPATIENT)
Age: 23
End: 2023-11-05

## 2023-11-26 ENCOUNTER — OFFICE VISIT (OUTPATIENT)
Dept: URGENT CARE | Facility: URGENT CARE | Age: 23
End: 2023-11-26

## 2023-11-26 VITALS
OXYGEN SATURATION: 97 % | BODY MASS INDEX: 25.55 KG/M2 | TEMPERATURE: 98.1 F | HEART RATE: 76 BPM | RESPIRATION RATE: 18 BRPM | DIASTOLIC BLOOD PRESSURE: 80 MMHG | SYSTOLIC BLOOD PRESSURE: 163 MMHG | WEIGHT: 199 LBS

## 2023-11-26 DIAGNOSIS — R21 RASH: Primary | ICD-10-CM

## 2023-11-26 PROCEDURE — 99212 OFFICE O/P EST SF 10 MIN: CPT | Performed by: FAMILY MEDICINE

## 2023-11-26 NOTE — PROGRESS NOTES
SUBJECTIVE: Navin Dupree is a 23 year old adult presenting with a chief complaint of rash penile head.    No past medical history on file.  No Known Allergies  Social History     Tobacco Use    Smoking status: Former    Smokeless tobacco: Never   Substance Use Topics    Alcohol use: Not Currently       ROS:  SKIN: no rash  GI: no vomiting    OBJECTIVE:  BP (!) 163/80 (BP Location: Right arm, Patient Position: Sitting, Cuff Size: Adult Regular)   Pulse 76   Temp 98.1  F (36.7  C) (Oral)   Resp 18   Wt 90.3 kg (199 lb)   SpO2 97%   BMI 25.55 kg/m  GENERAL APPEARANCE: healthy, alert and no distress  SKIN: coronal head penis with few scattered areas of bruise      ICD-10-CM    1. Rash  R21           Fluids/Rest, f/u if worse/not any better

## 2024-03-20 DIAGNOSIS — F64.9 GENDER DYSPHORIA: ICD-10-CM

## 2024-03-20 DIAGNOSIS — Z79.890 HORMONE REPLACEMENT THERAPY (HRT): ICD-10-CM

## 2024-03-20 RX ORDER — ESTRADIOL 2 MG/1
6 TABLET ORAL DAILY
Qty: 90 TABLET | Refills: 0 | Status: SHIPPED | OUTPATIENT
Start: 2024-03-20 | End: 2024-06-13

## 2024-03-20 NOTE — TELEPHONE ENCOUNTER
"Request for medication refill:    estradiol (ESTRACE) 2 MG tablet     Providers if patient needs an appointment and you are willing to give a one month supply please refill for one month and  send a letter/MyChart using \".SMILLIMITEDREFILL\" .smillimited and route chart to \"P [facility] \" (Giving one month refill in non controlled medications is strongly recommended before denial)    If refill has been denied, meaning absolutely no refills without visit, please complete the smart phrase \".smirxrefuse\" and route it to the \"P SMI MED REFILLS\"  pool to inform the patient and the pharmacy.    Deedee Degroot MA      "

## 2024-04-05 ENCOUNTER — MYC MEDICAL ADVICE (OUTPATIENT)
Dept: FAMILY MEDICINE | Facility: CLINIC | Age: 24
End: 2024-04-05
Payer: COMMERCIAL

## 2024-06-12 DIAGNOSIS — F64.9 GENDER DYSPHORIA: ICD-10-CM

## 2024-06-12 DIAGNOSIS — Z79.890 HORMONE REPLACEMENT THERAPY (HRT): ICD-10-CM

## 2024-06-13 RX ORDER — ESTRADIOL 2 MG/1
6 TABLET ORAL DAILY
Qty: 90 TABLET | Refills: 0 | Status: SHIPPED | OUTPATIENT
Start: 2024-06-13 | End: 2024-07-22

## 2024-06-13 RX ORDER — SPIRONOLACTONE 100 MG/1
200 TABLET, FILM COATED ORAL DAILY
Qty: 180 TABLET | Refills: 0 | Status: SHIPPED | OUTPATIENT
Start: 2024-06-13

## 2024-06-13 NOTE — TELEPHONE ENCOUNTER
"Request for medication refill:  estradiol (ESTRACE) 2 MG tablet     spironolactone (ALDACTONE) 100 MG tablet     Providers if patient needs an appointment and you are willing to give a one month supply please refill for one month and  send a letter/MyChart using \".SMILLIMITEDREFILL\" .smillimited and route chart to \"P [facility] \" (Giving one month refill in non controlled medications is strongly recommended before denial)    If refill has been denied, meaning absolutely no refills without visit, please complete the smart phrase \".smirxrefuse\" and route it to the \"P SMI MED REFILLS\"  pool to inform the patient and the pharmacy.    Kandace Paz, CMA      "

## 2024-07-22 ENCOUNTER — DOCUMENTATION ONLY (OUTPATIENT)
Dept: FAMILY MEDICINE | Facility: CLINIC | Age: 24
End: 2024-07-22
Payer: COMMERCIAL

## 2024-07-22 ENCOUNTER — TELEPHONE (OUTPATIENT)
Dept: FAMILY MEDICINE | Facility: CLINIC | Age: 24
End: 2024-07-22
Payer: COMMERCIAL

## 2024-07-22 NOTE — TELEPHONE ENCOUNTER
Reason for call: Schedule appointment     Attempt to reach: 1st    Outcome:Voicemail full unable to leave message    Detailed message left? No per  patient needs to be seen within the next month for follow up on medication.    Please return call to [facility]     Clinic phone number [phone]

## 2024-07-23 DIAGNOSIS — F64.9 GENDER DYSPHORIA: Primary | ICD-10-CM

## 2024-07-30 ENCOUNTER — LAB (OUTPATIENT)
Dept: LAB | Facility: CLINIC | Age: 24
End: 2024-07-30
Payer: COMMERCIAL

## 2024-07-30 DIAGNOSIS — F64.9 GENDER DYSPHORIA: ICD-10-CM

## 2024-07-30 LAB
ANION GAP SERPL CALCULATED.3IONS-SCNC: 9 MMOL/L (ref 7–15)
BUN SERPL-MCNC: 13.6 MG/DL (ref 6–20)
CALCIUM SERPL-MCNC: 9.7 MG/DL (ref 8.8–10.4)
CHLORIDE SERPL-SCNC: 103 MMOL/L (ref 98–107)
CHOLEST SERPL-MCNC: 167 MG/DL
CREAT SERPL-MCNC: 1.03 MG/DL (ref 0.51–1.17)
EGFRCR SERPLBLD CKD-EPI 2021: >90 ML/MIN/1.73M2
ESTRADIOL SERPL-MCNC: 111 PG/ML
FASTING STATUS PATIENT QL REPORTED: ABNORMAL
FASTING STATUS PATIENT QL REPORTED: NORMAL
GLUCOSE SERPL-MCNC: 89 MG/DL (ref 70–99)
HCO3 SERPL-SCNC: 25 MMOL/L (ref 22–29)
HDLC SERPL-MCNC: 69 MG/DL
LDLC SERPL CALC-MCNC: 50 MG/DL
NONHDLC SERPL-MCNC: 98 MG/DL
POTASSIUM SERPL-SCNC: 4.3 MMOL/L (ref 3.4–5.3)
SODIUM SERPL-SCNC: 137 MMOL/L (ref 135–145)
TRIGL SERPL-MCNC: 238 MG/DL

## 2024-07-30 PROCEDURE — 36415 COLL VENOUS BLD VENIPUNCTURE: CPT

## 2024-07-30 PROCEDURE — 80061 LIPID PANEL: CPT

## 2024-07-30 PROCEDURE — 82670 ASSAY OF TOTAL ESTRADIOL: CPT

## 2024-07-30 PROCEDURE — 84403 ASSAY OF TOTAL TESTOSTERONE: CPT

## 2024-07-30 PROCEDURE — 80048 BASIC METABOLIC PNL TOTAL CA: CPT

## 2024-08-01 LAB — TESTOST SERPL-MCNC: 479 NG/DL (ref 8–950)

## 2024-08-05 ENCOUNTER — TELEPHONE (OUTPATIENT)
Dept: FAMILY MEDICINE | Facility: CLINIC | Age: 24
End: 2024-08-05
Payer: COMMERCIAL

## 2024-08-05 NOTE — RESULT ENCOUNTER NOTE
Could you call the patient and ask them to make an appointment to discuss results and HRT? Video is fine.     Assessment:  Started HRT 4/26/2022  Currently: PO estradiol 6mg daily, harpreet  200mg daily  Last visit: 6/16/2023  Labs: TT above goal despite estradiol being close to ideal range. Will discuss with patient, may be due to running out of estradiol. If has been taking consistently, will need dose or route adjustment.       Results communicated to patient via Kyp Dad verbalized DC and follow up instructions/ No questions at this time. Patient has no signs of resp distress or no change in LOC. Denies any pain at this time. Patient carried out with dad

## 2024-08-05 NOTE — TELEPHONE ENCOUNTER
Reason for call: Schedule appointment     Attempt to reach: 1st    Outcome:Voicemail full unable to leave message    Detailed message left? No, Per - Could you call the patient and ask them to make an appointment to discuss results and HRT? Video is fine.     Assessment:   Started HRT 4/26/2022   Currently: PO estradiol 6mg daily, harpreet  200mg daily   Last visit: 6/16/2023   Labs: TT above goal despite estradiol being close to ideal range. Will discuss with patient, may be due to running out of estradiol. If has been taking consistently, will need dose or route adjustment.     Please return call to [facility]     Clinic phone number [phone]

## 2024-10-10 ENCOUNTER — VIRTUAL VISIT (OUTPATIENT)
Dept: FAMILY MEDICINE | Facility: CLINIC | Age: 24
End: 2024-10-10
Payer: COMMERCIAL

## 2024-10-10 DIAGNOSIS — Z79.890 HORMONE REPLACEMENT THERAPY (HRT): Primary | ICD-10-CM

## 2024-10-10 DIAGNOSIS — Z20.6 CONTACT WITH AND (SUSPECTED) EXPOSURE TO HUMAN IMMUNODEFICIENCY VIRUS (HIV): ICD-10-CM

## 2024-10-10 DIAGNOSIS — F64.9 GENDER DYSPHORIA: ICD-10-CM

## 2024-10-10 PROCEDURE — 99214 OFFICE O/P EST MOD 30 MIN: CPT | Mod: 95

## 2024-10-10 RX ORDER — ESTRADIOL 1 MG/1
3 TABLET ORAL 2 TIMES DAILY
Qty: 540 TABLET | Refills: 3 | Status: SHIPPED | OUTPATIENT
Start: 2024-10-10

## 2024-10-10 RX ORDER — SPIRONOLACTONE 100 MG/1
100 TABLET, FILM COATED ORAL 2 TIMES DAILY
Qty: 180 TABLET | Refills: 3 | Status: SHIPPED | OUTPATIENT
Start: 2024-10-10

## 2024-10-10 NOTE — PROGRESS NOTES
Damaris is a 23 year old who is being evaluated via a billable video visit.          Assessment & Plan   Gender dysphoria  Hormone replacement therapy (HRT)  On gaHRT since 4/26/2022. Currently taking PO estradiol 6mg once daily, spironolactone  200mg daily. Last labs 7/30/24, TT was above goal (479) despite estradiol being close to ideal range (111). This is likely due to missing estradiol doses for the 2 weeks leading up to the lab. Not meeting embodiment goal of less body hair. This may be due to the fact that total testosterone has not been adequately suppressed since starting gaHRT in 2022. Will start with working on adherence and recheck TT and E in 1-3 months. This can be lab-only with a video visit to go over results. Will also split PO estradiol into 3mg BID.   - Med changes today? Same dose of estradiol and spironolactone, but split BID.   - Labs today? No, draw after at least 4 weeks of being back on estradiol   Refills:  - spironolactone (ALDACTONE) 100 MG tablet; Take 1 tablet (100 mg) by mouth 2 times daily.  - estradiol (ESTRACE) 1 MG tablet; Take 3 tablets (3 mg) by mouth 2 times daily.  Labs:  - Estradiol; Future  - Testosterone Free and Total; Future  - Basic metabolic panel; Future    Contact with and (suspected) exposure to human immunodeficiency virus (hiv)    HIV Prevention / PrEP - Follow up  Damaris is here for follow up concerning pre-exposure prophylaxis for HIV. Patient is member of high prevalence group. She is not currently taking PrEP because she is not sexually active, but will begin taking again when she is.  - HIV Antigen Antibody Combo; Future        Return in about 6 weeks (around 11/21/2024) for Follow up, with me.    Subjective   Damaris is a 23 year old, presenting for the following health issues:  Follow Up (Hormone levels )        10/10/2024    11:06 AM   Additional Questions   Roomed by Malik         10/10/2024    Information    services provided? No         Video Start Time: 11:34 AM    KATI Ocampo is a 23 year old individual that uses pronouns She/Her/Hers/Herself that presents today for follow up of gender affirming hormone therapy for gender affirmation.     GAHT history   Started HRT 4/26/2022  Last visit: 6/16/23: injecting estradiol didn't work out due to schedule, returned to 6mg estradiol PO      Last labs: TT above goal despite estradiol being close to ideal range.     Component      Latest Ref Rng 8/30/2022  3:24 PM 5/17/2023  3:58 PM 7/30/2024  3:22 PM   Testosterone Total      8 - 950 ng/dL 201  348  479      Component      Latest Ref Rng 8/30/2022  3:24 PM 5/17/2023  3:58 PM 7/30/2024  3:22 PM   Estradiol      pg/mL 106  192  111      S/p gonadectomy? No    Interval history  Currently on: PO estradiol 6mg daily, harpreet  200mg daily  Missed doses?  Yes, ran out of estradiol PO. 2 weeks before the lab draw had no estrogen,  before that was stretching it out.     Overall things are going: very well  What changes have you noticed? Fat redistribution, breast growth, less body hair but not yet at goal    Unmet embodiment goals?  Yes - body hair  Any changes you are not liking? No    Side effects?   Chest Pains: No  Shortness of breath: No  Leg swelling: No  Hot flashes:  No  Mood swings: No  Migraine: No  Genital pain: No  Bothersome changes in sexual experience or libido: yes but does not want to discuss    Sexual Health  - New sexual partners: No  - Contraception:   - Desires STI screening? No  - Interested in PrEP, Doxy PEP?   Currently on PrEP. Will start taking again when sexually active.       Mental Health  Rates as good                    Objective           Vitals:  No vitals were obtained today due to virtual visit.    Physical Exam   GENERAL: alert and no distress  RESP: No audible wheeze, cough  NEURO: Mentation and speech appropriate for age.  PSYCH: Appropriate affect, tone, and pace of words          Video-Visit Details    Type of service:   Video Visit   Video End Time:11:49 AM  Originating Location (pt. Location): Home    Distant Location (provider location):  On-site  Platform used for Video Visit: Tyesha  Signed Electronically by: Samira Means MD

## 2024-10-17 NOTE — PROGRESS NOTES
Preceptor Attestation:   Patient seen, evaluated and discussed with the resident. I have verified the content of the note, which accurately reflects my assessment of the patient and the plan of care.   Supervising Physician:  Trini Sanon, DO

## 2024-12-22 ENCOUNTER — HEALTH MAINTENANCE LETTER (OUTPATIENT)
Age: 24
End: 2024-12-22